# Patient Record
(demographics unavailable — no encounter records)

---

## 2024-12-23 NOTE — DISCUSSION/SUMMARY
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 Patient is scheduled for repeat  section on 24.  Patient with prior history of sPEC at GA 36.4 days ( ), carries a PMH of juvenile RA, Total ankle replacement, PEC ( ) and FH of HTN and heart disease ( grandma).  Delivering at [facility].  OB History:  : Twin Pregnancy delivered at 36.4 due sPEC - C/ section  BP logs 120- 130/ 70- 80s # Prior history of sPEC currently pregnant :  BP Stable Encouraged Patient to monitor BP at home, keep a log, and report results back to us for evaluation. Based on the results, we will adjust medications as necessary. Asa 2 tabs QD for PEC prevention   # SOB: Echocardiogram to assess the structural and valvular function - Pending

## 2024-12-23 NOTE — DISCUSSION/SUMMARY
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 Patient is scheduled for repeat  section on 24.  Patient with prior history of sPEC at GA 36.4 days ( ), carries a PMH of juvenile RA, Total ankle replacement, PEC ( ) and FH of HTN and heart disease ( grandma).  Delivering at [facility].  OB History:  : Twin Pregnancy delivered at 36.4 due sPEC - C/ section  BP logs 120- 130/ 70- 80s # Prior history of sPEC currently pregnant :  BP Stable Encouraged Patient to monitor BP at home, keep a log, and report results back to us for evaluation. Based on the results, we will adjust medications as necessary. Asa 2 tabs QD for PEC prevention   # SOB: Echocardiogram to assess the structural and valvular function - Pending

## 2024-12-23 NOTE — HISTORY OF PRESENT ILLNESS
[Home] : at home, [unfilled] , at the time of the visit. [Other Location: e.g. 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 Patient is scheduled for repeat  section on 24.  Patient with prior history of sPEC at GA 36.4 days ( ), carries a PMH of juvenile RA, Total ankle replacement, PEC ( ) and FH of HTN and heart disease ( grandma).  Delivering at [facility].  OB History:  : Twin Pregnancy delivered at 36.4 due sPEC - C/ section  BP logs 120- 130/ 70- 80s   # Prior history of sPEC currently pregnant :  BP Stable Encouraged Patient to monitor BP at home, keep a log, and report results back to us for evaluation. Based on the results, we will adjust medications as necessary. Asa 2 tabs QD for PEC prevention   # SOB: Echocardiogram to assess the structural and valvular function - Pending   F/u after delivery

## 2024-12-23 NOTE — HISTORY OF PRESENT ILLNESS
[Home] : at home, [unfilled] , at the time of the visit. [Other Location: e.g. 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 Patient is scheduled for repeat  section on 24.  Patient with prior history of sPEC at GA 36.4 days ( ), carries a PMH of juvenile RA, Total ankle replacement, PEC ( ) and FH of HTN and heart disease ( grandma).  Delivering at [facility].  OB History:  : Twin Pregnancy delivered at 36.4 due sPEC - C/ section  BP logs 120- 130/ 70- 80s   # Prior history of sPEC currently pregnant :  BP Stable Encouraged Patient to monitor BP at home, keep a log, and report results back to us for evaluation. Based on the results, we will adjust medications as necessary. Asa 2 tabs QD for PEC prevention   # SOB: Echocardiogram to assess the structural and valvular function - Pending   F/u after delivery     - - -